# Patient Record
Sex: FEMALE | Race: BLACK OR AFRICAN AMERICAN | Employment: FULL TIME | URBAN - METROPOLITAN AREA
[De-identification: names, ages, dates, MRNs, and addresses within clinical notes are randomized per-mention and may not be internally consistent; named-entity substitution may affect disease eponyms.]

---

## 2023-04-05 PROBLEM — O35.2XX1 HEREDITARY DISEASE IN FAMILY POSSIBLY AFFECTING FETUS, AFFECTING MANAGEMENT OF MOTHER IN PREGNANCY, FETUS 1 (HHS-HCC): Status: ACTIVE | Noted: 2023-04-05

## 2023-04-05 PROBLEM — O26.849 FETAL SIZE INCONSISTENT WITH DATES (HHS-HCC): Status: ACTIVE | Noted: 2023-04-05

## 2023-04-05 PROBLEM — K02.9 PAIN DUE TO DENTAL CARIES: Status: ACTIVE | Noted: 2023-04-05

## 2023-04-05 PROBLEM — R20.2 PARESTHESIA OF RIGHT ARM: Status: ACTIVE | Noted: 2023-04-05

## 2023-04-05 PROBLEM — D58.2 HEMOGLOBIN C TRAIT (CMS-HCC): Status: ACTIVE | Noted: 2023-04-05

## 2023-04-05 PROBLEM — O09.299 HISTORY OF PRE-ECLAMPSIA IN PRIOR PREGNANCY, CURRENTLY PREGNANT (HHS-HCC): Status: ACTIVE | Noted: 2023-04-05

## 2023-04-05 PROBLEM — O09.43 HIGH RISK MULTIGRAVIDA IN THIRD TRIMESTER (HHS-HCC): Status: ACTIVE | Noted: 2023-04-05

## 2023-04-05 PROBLEM — E55.9 VITAMIN D INSUFFICIENCY: Status: ACTIVE | Noted: 2023-04-05

## 2023-04-05 PROBLEM — R35.0 URINARY FREQUENCY: Status: ACTIVE | Noted: 2023-04-05

## 2023-04-05 PROBLEM — O99.210 OBESITY IN PREGNANCY (HHS-HCC): Status: ACTIVE | Noted: 2023-04-05

## 2023-04-05 PROBLEM — R10.9 ABDOMINAL CRAMPS: Status: ACTIVE | Noted: 2023-04-05

## 2023-04-06 ENCOUNTER — OFFICE VISIT (OUTPATIENT)
Dept: PRIMARY CARE | Facility: CLINIC | Age: 28
End: 2023-04-06
Payer: COMMERCIAL

## 2023-04-06 VITALS
SYSTOLIC BLOOD PRESSURE: 130 MMHG | DIASTOLIC BLOOD PRESSURE: 80 MMHG | WEIGHT: 242 LBS | HEART RATE: 90 BPM | BODY MASS INDEX: 42.88 KG/M2 | OXYGEN SATURATION: 97 % | HEIGHT: 63 IN

## 2023-04-06 DIAGNOSIS — Z30.011 ORAL CONTRACEPTION INITIATION: ICD-10-CM

## 2023-04-06 DIAGNOSIS — M70.51 PATELLAR BURSITIS OF RIGHT KNEE: ICD-10-CM

## 2023-04-06 DIAGNOSIS — E66.01 MORBID OBESITY WITH BODY MASS INDEX (BMI) OF 40.0 OR HIGHER (MULTI): Primary | ICD-10-CM

## 2023-04-06 PROCEDURE — 1036F TOBACCO NON-USER: CPT | Performed by: INTERNAL MEDICINE

## 2023-04-06 PROCEDURE — 99204 OFFICE O/P NEW MOD 45 MIN: CPT | Performed by: INTERNAL MEDICINE

## 2023-04-06 RX ORDER — NORETHINDRONE ACETATE AND ETHINYL ESTRADIOL 1.5-30(21)
1 KIT ORAL DAILY
Qty: 90 TABLET | Refills: 3 | Status: SHIPPED | OUTPATIENT
Start: 2023-04-06 | End: 2024-01-24 | Stop reason: SDUPTHER

## 2023-04-06 RX ORDER — MELOXICAM 7.5 MG/1
7.5-15 TABLET ORAL DAILY PRN
Qty: 60 TABLET | Refills: 1 | Status: SHIPPED | OUTPATIENT
Start: 2023-04-06 | End: 2024-04-05

## 2023-07-27 ENCOUNTER — APPOINTMENT (OUTPATIENT)
Dept: PRIMARY CARE | Facility: CLINIC | Age: 28
End: 2023-07-27
Payer: COMMERCIAL

## 2024-01-22 ENCOUNTER — APPOINTMENT (OUTPATIENT)
Dept: OBSTETRICS AND GYNECOLOGY | Facility: CLINIC | Age: 29
End: 2024-01-22
Payer: COMMERCIAL

## 2024-01-23 PROBLEM — R00.0 TACHYCARDIA: Status: ACTIVE | Noted: 2018-01-18

## 2024-01-23 PROBLEM — O99.810 ABNORMAL GLUCOSE AFFECTING PREGNANCY (HHS-HCC): Status: ACTIVE | Noted: 2018-03-14

## 2024-01-23 PROBLEM — M94.0 COSTOCHONDRITIS: Status: ACTIVE | Noted: 2024-01-23

## 2024-01-23 PROBLEM — M25.561 PATELLOFEMORAL INSTABILITY OF RIGHT KNEE WITH PAIN: Status: ACTIVE | Noted: 2022-02-14

## 2024-01-23 PROBLEM — O21.9 NAUSEA AND VOMITING DURING PREGNANCY (HHS-HCC): Status: ACTIVE | Noted: 2018-03-13

## 2024-01-23 PROBLEM — M25.361 PATELLOFEMORAL INSTABILITY OF RIGHT KNEE WITH PAIN: Status: ACTIVE | Noted: 2022-02-14

## 2024-01-23 PROBLEM — R07.9 CHEST PAIN: Status: ACTIVE | Noted: 2018-01-18

## 2024-01-23 PROBLEM — K01.1 IMPACTED TOOTH: Status: ACTIVE | Noted: 2018-02-13

## 2024-01-23 RX ORDER — FLUTICASONE PROPIONATE 50 MCG
2 SPRAY, SUSPENSION (ML) NASAL DAILY
COMMUNITY
Start: 2014-10-30 | End: 2024-01-24 | Stop reason: WASHOUT

## 2024-01-23 RX ORDER — EPINEPHRINE 0.3 MG/.3ML
INJECTION SUBCUTANEOUS
COMMUNITY
Start: 2014-09-17 | End: 2024-01-24 | Stop reason: SDUPTHER

## 2024-01-23 RX ORDER — IBUPROFEN 600 MG/1
TABLET ORAL
COMMUNITY
End: 2024-01-24 | Stop reason: WASHOUT

## 2024-01-23 RX ORDER — TRAMADOL HYDROCHLORIDE 50 MG/1
TABLET ORAL
COMMUNITY
Start: 2023-08-10 | End: 2024-01-24 | Stop reason: WASHOUT

## 2024-01-23 RX ORDER — SPIRONOLACTONE 50 MG/1
100 TABLET, FILM COATED ORAL DAILY
COMMUNITY
Start: 2023-06-01 | End: 2024-01-24 | Stop reason: WASHOUT

## 2024-01-24 ENCOUNTER — OFFICE VISIT (OUTPATIENT)
Dept: PRIMARY CARE | Facility: CLINIC | Age: 29
End: 2024-01-24
Payer: COMMERCIAL

## 2024-01-24 ENCOUNTER — LAB (OUTPATIENT)
Dept: LAB | Facility: LAB | Age: 29
End: 2024-01-24
Payer: COMMERCIAL

## 2024-01-24 VITALS
WEIGHT: 247 LBS | HEART RATE: 78 BPM | SYSTOLIC BLOOD PRESSURE: 136 MMHG | HEIGHT: 63 IN | DIASTOLIC BLOOD PRESSURE: 80 MMHG | RESPIRATION RATE: 18 BRPM | OXYGEN SATURATION: 93 % | BODY MASS INDEX: 43.77 KG/M2

## 2024-01-24 DIAGNOSIS — Z00.00 HEALTHCARE MAINTENANCE: Primary | ICD-10-CM

## 2024-01-24 DIAGNOSIS — Z12.4 CERVICAL CANCER SCREENING: ICD-10-CM

## 2024-01-24 DIAGNOSIS — L20.9 ATOPIC DERMATITIS, UNSPECIFIED TYPE: ICD-10-CM

## 2024-01-24 DIAGNOSIS — Z11.3 SCREEN FOR STD (SEXUALLY TRANSMITTED DISEASE): ICD-10-CM

## 2024-01-24 DIAGNOSIS — Z30.011 ORAL CONTRACEPTION INITIATION: ICD-10-CM

## 2024-01-24 DIAGNOSIS — Z87.892 HISTORY OF ANAPHYLAXIS: ICD-10-CM

## 2024-01-24 DIAGNOSIS — L21.9 SEBORRHEIC DERMATITIS: ICD-10-CM

## 2024-01-24 DIAGNOSIS — Z00.00 HEALTHCARE MAINTENANCE: ICD-10-CM

## 2024-01-24 PROBLEM — Z30.09 CONTRACEPTIVE EDUCATION: Status: ACTIVE | Noted: 2024-01-24

## 2024-01-24 LAB
ALBUMIN SERPL BCP-MCNC: 4.5 G/DL (ref 3.4–5)
ALP SERPL-CCNC: 45 U/L (ref 33–110)
ALT SERPL W P-5'-P-CCNC: 15 U/L (ref 7–45)
ANION GAP SERPL CALC-SCNC: 13 MMOL/L (ref 10–20)
AST SERPL W P-5'-P-CCNC: 15 U/L (ref 9–39)
BASOPHILS # BLD AUTO: 0.07 X10*3/UL (ref 0–0.1)
BASOPHILS NFR BLD AUTO: 0.7 %
BILIRUB SERPL-MCNC: 0.6 MG/DL (ref 0–1.2)
BUN SERPL-MCNC: 13 MG/DL (ref 6–23)
CALCIUM SERPL-MCNC: 9.5 MG/DL (ref 8.6–10.6)
CHLORIDE SERPL-SCNC: 106 MMOL/L (ref 98–107)
CHOLEST SERPL-MCNC: 169 MG/DL (ref 0–199)
CHOLESTEROL/HDL RATIO: 2.3
CO2 SERPL-SCNC: 26 MMOL/L (ref 21–32)
CREAT SERPL-MCNC: 0.72 MG/DL (ref 0.5–1.05)
EGFRCR SERPLBLD CKD-EPI 2021: >90 ML/MIN/1.73M*2
EOSINOPHIL # BLD AUTO: 0.23 X10*3/UL (ref 0–0.7)
EOSINOPHIL NFR BLD AUTO: 2.3 %
ERYTHROCYTE [DISTWIDTH] IN BLOOD BY AUTOMATED COUNT: 13.8 % (ref 11.5–14.5)
EST. AVERAGE GLUCOSE BLD GHB EST-MCNC: 105 MG/DL
GLUCOSE SERPL-MCNC: 75 MG/DL (ref 74–99)
HBA1C MFR BLD: 5.3 %
HCT VFR BLD AUTO: 39.6 % (ref 36–46)
HDLC SERPL-MCNC: 74.8 MG/DL
HGB BLD-MCNC: 13.9 G/DL (ref 12–16)
HIV 1+2 AB+HIV1 P24 AG SERPL QL IA: NONREACTIVE
IMM GRANULOCYTES # BLD AUTO: 0.02 X10*3/UL (ref 0–0.7)
IMM GRANULOCYTES NFR BLD AUTO: 0.2 % (ref 0–0.9)
LDLC SERPL DIRECT ASSAY-MCNC: 90 MG/DL (ref 0–129)
LYMPHOCYTES # BLD AUTO: 3.77 X10*3/UL (ref 1.2–4.8)
LYMPHOCYTES NFR BLD AUTO: 37 %
MCH RBC QN AUTO: 30.4 PG (ref 26–34)
MCHC RBC AUTO-ENTMCNC: 35.1 G/DL (ref 32–36)
MCV RBC AUTO: 87 FL (ref 80–100)
MONOCYTES # BLD AUTO: 0.9 X10*3/UL (ref 0.1–1)
MONOCYTES NFR BLD AUTO: 8.8 %
NEUTROPHILS # BLD AUTO: 5.19 X10*3/UL (ref 1.2–7.7)
NEUTROPHILS NFR BLD AUTO: 51 %
NON-HDL CHOLESTEROL: 94 MG/DL (ref 0–149)
NRBC BLD-RTO: 0 /100 WBCS (ref 0–0)
PLATELET # BLD AUTO: 311 X10*3/UL (ref 150–450)
POTASSIUM SERPL-SCNC: 4.8 MMOL/L (ref 3.5–5.3)
PROT SERPL-MCNC: 7.4 G/DL (ref 6.4–8.2)
RBC # BLD AUTO: 4.57 X10*6/UL (ref 4–5.2)
SODIUM SERPL-SCNC: 140 MMOL/L (ref 136–145)
TREPONEMA PALLIDUM IGG+IGM AB [PRESENCE] IN SERUM OR PLASMA BY IMMUNOASSAY: NONREACTIVE
TSH SERPL-ACNC: 1.31 MIU/L (ref 0.44–3.98)
WBC # BLD AUTO: 10.2 X10*3/UL (ref 4.4–11.3)

## 2024-01-24 PROCEDURE — 87800 DETECT AGNT MULT DNA DIREC: CPT

## 2024-01-24 PROCEDURE — 99395 PREV VISIT EST AGE 18-39: CPT | Performed by: INTERNAL MEDICINE

## 2024-01-24 PROCEDURE — 83721 ASSAY OF BLOOD LIPOPROTEIN: CPT

## 2024-01-24 PROCEDURE — 36415 COLL VENOUS BLD VENIPUNCTURE: CPT

## 2024-01-24 PROCEDURE — 83718 ASSAY OF LIPOPROTEIN: CPT

## 2024-01-24 PROCEDURE — 1036F TOBACCO NON-USER: CPT | Performed by: INTERNAL MEDICINE

## 2024-01-24 PROCEDURE — 87389 HIV-1 AG W/HIV-1&-2 AB AG IA: CPT

## 2024-01-24 PROCEDURE — 3008F BODY MASS INDEX DOCD: CPT | Performed by: INTERNAL MEDICINE

## 2024-01-24 PROCEDURE — 80053 COMPREHEN METABOLIC PANEL: CPT

## 2024-01-24 PROCEDURE — 85025 COMPLETE CBC W/AUTO DIFF WBC: CPT

## 2024-01-24 PROCEDURE — 84443 ASSAY THYROID STIM HORMONE: CPT

## 2024-01-24 PROCEDURE — 86780 TREPONEMA PALLIDUM: CPT

## 2024-01-24 PROCEDURE — 83036 HEMOGLOBIN GLYCOSYLATED A1C: CPT

## 2024-01-24 PROCEDURE — 82465 ASSAY BLD/SERUM CHOLESTEROL: CPT

## 2024-01-24 PROCEDURE — 87661 TRICHOMONAS VAGINALIS AMPLIF: CPT

## 2024-01-24 RX ORDER — NORETHINDRONE ACETATE AND ETHINYL ESTRADIOL 1.5-30(21)
1 KIT ORAL DAILY
Qty: 90 TABLET | Refills: 3 | Status: SHIPPED | OUTPATIENT
Start: 2024-01-24 | End: 2025-01-23

## 2024-01-24 RX ORDER — KETOCONAZOLE 20 MG/ML
SHAMPOO, SUSPENSION TOPICAL 2 TIMES WEEKLY
Qty: 120 ML | Refills: 0 | Status: SHIPPED | OUTPATIENT
Start: 2024-01-25

## 2024-01-24 RX ORDER — KETOCONAZOLE 20 MG/G
CREAM TOPICAL 2 TIMES DAILY
Qty: 30 G | Refills: 2 | Status: SHIPPED | OUTPATIENT
Start: 2024-01-24 | End: 2024-04-23

## 2024-01-24 RX ORDER — TRIAMCINOLONE ACETONIDE 1 MG/G
OINTMENT TOPICAL 2 TIMES DAILY PRN
Qty: 15 G | Refills: 0 | Status: SHIPPED | OUTPATIENT
Start: 2024-01-24 | End: 2024-05-23

## 2024-01-24 RX ORDER — EPINEPHRINE 0.3 MG/.3ML
INJECTION SUBCUTANEOUS
Qty: 2 EACH | Refills: 3 | Status: SHIPPED | OUTPATIENT
Start: 2024-01-24

## 2024-01-24 ASSESSMENT — ENCOUNTER SYMPTOMS
FEVER: 0
COUGH: 0
HEMATURIA: 0
JOINT SWELLING: 0
BACK PAIN: 0
ABDOMINAL DISTENTION: 0
SORE THROAT: 0
NECK STIFFNESS: 0
FREQUENCY: 0
BLOOD IN STOOL: 0
ABDOMINAL PAIN: 0
DYSURIA: 0
NUMBNESS: 0
DIFFICULTY URINATING: 0
NAUSEA: 0
WHEEZING: 0
SHORTNESS OF BREATH: 0
ARTHRALGIAS: 0
SINUS PRESSURE: 0
DIARRHEA: 0
CHILLS: 0
MYALGIAS: 0
FATIGUE: 0
DIAPHORESIS: 0
VOMITING: 0
LIGHT-HEADEDNESS: 0
APPETITE CHANGE: 0
RHINORRHEA: 0
DIZZINESS: 0
HEADACHES: 0
CONSTIPATION: 0
WEAKNESS: 0
PALPITATIONS: 0
NECK PAIN: 0

## 2024-01-24 NOTE — ASSESSMENT & PLAN NOTE
She complains of dry flaky scalp  Ketoconazole shampoo twice weekly  Ketoconazole cream twice daily to scalp for 2 weeks then stop

## 2024-01-24 NOTE — PROGRESS NOTES
"Subjective   Patient ID: Srinivas Francis is a 29 y.o. female who presents for Annual Exam (Establish care/Refills /Discuss birth control options ).    HPI   She presents to establish care.  She has no chronic medical conditions.  She does not smoke cigarettes occasionally drinks alcohol.  She complains of dry flaky scalp and rash on left side of neck.    Review of Systems   Constitutional:  Negative for appetite change, chills, diaphoresis, fatigue and fever.   HENT:  Negative for congestion, ear discharge, ear pain, hearing loss, postnasal drip, rhinorrhea, sinus pressure, sore throat and tinnitus.    Eyes:  Negative for visual disturbance.   Respiratory:  Negative for cough, shortness of breath and wheezing.    Cardiovascular:  Negative for chest pain, palpitations and leg swelling.   Gastrointestinal:  Negative for abdominal distention, abdominal pain, blood in stool, constipation, diarrhea, nausea and vomiting.   Genitourinary:  Negative for decreased urine volume, difficulty urinating, dysuria, frequency, hematuria and urgency.   Musculoskeletal:  Negative for arthralgias, back pain, gait problem, joint swelling, myalgias, neck pain and neck stiffness.   Skin:  Negative for rash.   Neurological:  Negative for dizziness, weakness, light-headedness, numbness and headaches.       Objective   /80   Pulse 78   Resp 18   Ht 1.6 m (5' 3\")   Wt 112 kg (247 lb)   LMP 01/09/2024 (Exact Date)   SpO2 93%   BMI 43.75 kg/m²     Physical Exam  Vitals reviewed.   Constitutional:       Appearance: Normal appearance. She is normal weight.   HENT:      Right Ear: Tympanic membrane and external ear normal.      Left Ear: Tympanic membrane and external ear normal.   Eyes:      Extraocular Movements: Extraocular movements intact.      Conjunctiva/sclera: Conjunctivae normal.      Pupils: Pupils are equal, round, and reactive to light.   Cardiovascular:      Rate and Rhythm: Normal rate and regular rhythm.      Pulses: " Normal pulses.   Pulmonary:      Effort: Pulmonary effort is normal.      Breath sounds: Normal breath sounds.   Abdominal:      General: Bowel sounds are normal.      Palpations: Abdomen is soft.   Musculoskeletal:         General: Normal range of motion.      Cervical back: Normal range of motion.   Skin:     General: Skin is warm and dry.   Neurological:      General: No focal deficit present.      Mental Status: She is oriented to person, place, and time.   Psychiatric:         Mood and Affect: Mood normal.         Behavior: Behavior normal.         Assessment/Plan   Problem List Items Addressed This Visit             ICD-10-CM    Healthcare maintenance - Primary Z00.00     See gynecology for pap smear          Relevant Orders    CBC and Auto Differential    Cholesterol, LDL Direct    Comprehensive Metabolic Panel    Hemoglobin A1C    Lipid Panel Non-Fasting    TSH with reflex to Free T4 if abnormal    History of anaphylaxis Z87.892    Relevant Medications    EPINEPHrine 0.3 mg/0.3 mL injection syringe    Screen for STD (sexually transmitted disease) Z11.3    Relevant Orders    C. Trachomatis / N. Gonorrhoeae, Amplified Detection    HIV 1/2 Antigen/Antibody Screen with Reflex to Confirmation    Syphilis Screen with Reflex    Trichomonas vaginalis, Nucleic Acid Detection    Seborrheic dermatitis L21.9     She complains of dry flaky scalp  Ketoconazole shampoo twice weekly  Ketoconazole cream twice daily to scalp for 2 weeks then stop         Relevant Medications    ketoconazole (NIZOral) 2 % cream    ketoconazole (NIZOral) 2 % shampoo (Start on 1/25/2024)    Atopic dermatitis L20.9     She complains of a pruritic rash on the left side of the neck  O/E: Hypopigmented maculopapular rash with excoriations to the left lateral side of neck  Start triamcinolone to affected area twice daily for 2 weeks then stop           Relevant Medications    triamcinolone (Kenalog) 0.1 % ointment    Oral contraception initiation  Z30.011    Relevant Medications    norethindrone-e.estradioL-iron (Microgestin FE 1.5/30) 1.5 mg-30 mcg (21)/75 mg (7) tablet    BMI 40.0-44.9, adult (CMS/HCC) Z68.41    Relevant Orders    Referral to Nutrition Services    Cervical cancer screening Z12.4    Relevant Orders    Referral to Gynecology     RTC 6-12 mo

## 2024-01-24 NOTE — ASSESSMENT & PLAN NOTE
She complains of a pruritic rash on the left side of the neck  O/E: Hypopigmented maculopapular rash with excoriations to the left lateral side of neck  Start triamcinolone to affected area twice daily for 2 weeks then stop

## 2024-01-25 LAB
C TRACH RRNA SPEC QL NAA+PROBE: NEGATIVE
N GONORRHOEA DNA SPEC QL PROBE+SIG AMP: NEGATIVE
T VAGINALIS RRNA SPEC QL NAA+PROBE: NEGATIVE

## 2024-06-18 ENCOUNTER — TELEPHONE (OUTPATIENT)
Dept: OBSTETRICS AND GYNECOLOGY | Facility: CLINIC | Age: 29
End: 2024-06-18
Payer: COMMERCIAL

## 2024-06-18 DIAGNOSIS — R10.11 RUQ PAIN: ICD-10-CM

## 2024-06-18 NOTE — TELEPHONE ENCOUNTER
Pt states she is having AUB and night sweats. Pt LMP 6/6/24 through 6/10/24. Pt currently on BC pills. Pt states she started spotting again yesterday and passed some ildefonso sized blood clot and had some cramping. Today she is having some bleeding that just deep red color. Pt scheduled to see Felicity Wolf Thursday 6/20 at [facility].  Discussed bleeding precautions with pt.  Pt denies having further questions at this time.

## 2024-06-20 ENCOUNTER — OFFICE VISIT (OUTPATIENT)
Dept: OBSTETRICS AND GYNECOLOGY | Facility: CLINIC | Age: 29
End: 2024-06-20
Payer: COMMERCIAL

## 2024-06-20 VITALS
BODY MASS INDEX: 43.12 KG/M2 | SYSTOLIC BLOOD PRESSURE: 126 MMHG | WEIGHT: 243.4 LBS | HEIGHT: 63 IN | DIASTOLIC BLOOD PRESSURE: 84 MMHG

## 2024-06-20 DIAGNOSIS — Z30.013 ENCOUNTER FOR PRESCRIPTION FOR DEPO-PROVERA: ICD-10-CM

## 2024-06-20 DIAGNOSIS — Z11.3 SCREEN FOR STD (SEXUALLY TRANSMITTED DISEASE): ICD-10-CM

## 2024-06-20 DIAGNOSIS — N93.9 ABNORMAL UTERINE BLEEDING (AUB): Primary | ICD-10-CM

## 2024-06-20 LAB — PREGNANCY TEST URINE, POC: NEGATIVE

## 2024-06-20 PROCEDURE — 87491 CHLMYD TRACH DNA AMP PROBE: CPT

## 2024-06-20 PROCEDURE — 3008F BODY MASS INDEX DOCD: CPT

## 2024-06-20 PROCEDURE — 87661 TRICHOMONAS VAGINALIS AMPLIF: CPT

## 2024-06-20 PROCEDURE — 99213 OFFICE O/P EST LOW 20 MIN: CPT

## 2024-06-20 PROCEDURE — 96372 THER/PROPH/DIAG INJ SC/IM: CPT

## 2024-06-20 PROCEDURE — 81025 URINE PREGNANCY TEST: CPT

## 2024-06-20 PROCEDURE — 87591 N.GONORRHOEAE DNA AMP PROB: CPT

## 2024-06-20 RX ORDER — MEDROXYPROGESTERONE ACETATE 150 MG/ML
150 INJECTION, SUSPENSION INTRAMUSCULAR ONCE
Status: COMPLETED | OUTPATIENT
Start: 2024-06-20 | End: 2024-06-20

## 2024-06-20 ASSESSMENT — ENCOUNTER SYMPTOMS
ALLERGIC/IMMUNOLOGIC NEGATIVE: 0
CARDIOVASCULAR NEGATIVE: 0
ENDOCRINE NEGATIVE: 0
GASTROINTESTINAL NEGATIVE: 0
RESPIRATORY NEGATIVE: 0
MUSCULOSKELETAL NEGATIVE: 0
CONSTITUTIONAL NEGATIVE: 0
HEMATOLOGIC/LYMPHATIC NEGATIVE: 0
EYES NEGATIVE: 0
NEUROLOGICAL NEGATIVE: 0
PSYCHIATRIC NEGATIVE: 0

## 2024-06-20 ASSESSMENT — PAIN SCALES - GENERAL: PAINLEVEL: 0-NO PAIN

## 2024-06-20 NOTE — PROGRESS NOTES
"Subjective   Srinivas German is a 29 y.o. female who is here for Vaginal Bleeding (Pt reports breakthrough bleeding after regular menstraual cycle on bc pills currently, has concerns).     Concerns today:  At night for the past 2 weeks she's been having hot flashes. 6-10 regular period. Bleeding again 2 days 17th and 18th and then stopped. Patient is on OCP and missed a few days earlier this month.     Periods are regular every 28-30 days, lasting 3 days.   Dysmenorrhea: this past month 6/10 cramping. Managed well by tylenol .   Cyclic symptoms include none.      Sexual Activity: sexually active, male partners; Patient reports 1 partners in the last 12 months.  Pain with intercourse? No   Loss of desire? No   Able to have an orgasm? Yes     History of prior STI: none  Desires STI screening? Yes  Current contraception: OCP (estrogen/progesterone)    OB History    Para Term  AB Living   2 2 1 1 0 2   SAB IAB Ectopic Multiple Live Births   0 0 0 0 2      Objective   /84   Ht 1.6 m (5' 3\")   Wt 110 kg (243 lb 6.4 oz)   LMP 2024 (Exact Date)      General:   Alert and oriented x 3, obese   Abdomen: Soft, non tender   Pelvic exam declined.       Assessment/Plan   Diagnoses and all orders for this visit:  Screen for STD (sexually transmitted disease)  -     Trichomonas vaginalis, Amplified; Future  -     C. Trachomatis / N. Gonorrhoeae, Amplified Detection  Encounter for prescription for depo-Provera  -     medroxyPROGESTERone (Depo-Provera) injection 150 mg  -     POCT pregnancy, urine manually resulted    We discussed treatments for abnormal bleeding. Patient is interested in depo. Discussed risks and benefits. Administered today.   Discussed importance of timed dosing and follow up. Patient verbalizes understanding.   Offered patient blood born stds, declines at this time.   Encouraged patient to follow up with an annual GYN visit. Plan to address hot flashes,  contraception, and pap at " that time.    Encouraged to reach out to our office with any questions or concerns.       MARNI Jaquez

## 2024-07-02 ENCOUNTER — APPOINTMENT (OUTPATIENT)
Dept: PRIMARY CARE | Facility: CLINIC | Age: 29
End: 2024-07-02
Payer: COMMERCIAL

## 2024-07-12 ENCOUNTER — TELEMEDICINE CLINICAL SUPPORT (OUTPATIENT)
Dept: NUTRITION | Facility: HOSPITAL | Age: 29
End: 2024-07-12
Payer: COMMERCIAL

## 2024-07-12 VITALS — BODY MASS INDEX: 43.12 KG/M2 | HEIGHT: 63 IN

## 2024-07-12 PROCEDURE — 97802 MEDICAL NUTRITION INDIV IN: CPT | Mod: 95 | Performed by: INTERNAL MEDICINE

## 2024-07-12 NOTE — PROGRESS NOTES
"Nutrition Assessment     Reason for Visit:  Srinivas German is a 29 y.o. female who presents for Weight Loss    Anthropometrics:  Anthropometrics  Height: 160 cm (5' 3\")  IBW/kg (Dietitian Calculated): 52.2 kg  Weight Change  Weight History / % Weight Change: No recent significant weight changes per pt chart.  Significant Weight Loss: No       Wt Readings from Last 10 Encounters:   06/20/24 110 kg (243 lb 6.4 oz)   01/24/24 112 kg (247 lb)   04/06/23 110 kg (242 lb)   11/07/22 103 kg (228 lb)   09/27/22 108 kg (239 lb)   09/20/22 108 kg (239 lb)   07/15/22 103 kg (226 lb 2 oz)   07/04/22 105 kg (231 lb 14.8 oz)   06/14/22 104 kg (230 lb)   05/02/22 103 kg (227 lb)       Food And Nutrient Intake:  Food and Nutrient History  Food and Nutrient History: Spoke with pt over the phone today. She states that she is looking to get more information on a healthy diet. She reports that she would like to start eating better and find alternatives to help her curve her appetite. She lives in the household with her  and kids, where both her and her  work from home. Pt was also interested in different recipes to try.  Energy Intake: Good > 75 %  Fluid Intake: Primarily water  Food Allergy: Tree nuts and shellfish  GI Symptoms: none     Food Intake  Meal 1: Breakfast- Omlette with hernandez and hash browns  Meal 2: Lunch- Skip or have a snack of chips  Meal 3: Dinner- Cabbage and cornbread or chicken wrap and salad  Snacks: Popcorn, chips, ice cream    Food Preparation  Cooking: Patient  Grocery Shopping: Patient  Dining Out: 1 to 3 times a week  Other: [employer]orders out breakfast, lunch, or dinner when working from home.       Medication and Complementary/Alternative Medicine Use  Prescription Medication Use: Not currently taking any medications    Physical Activities:  Physical Activity  Physical Activity History: Pt has started walking with children around the neighborhood one time per week.       Nutrition Focused Physical " "Exam:  Subcutaneous Fat Loss  Orbital Fat Pads: Defer (Virtual visit. Pt with good appetite and no significant weight loss.)  Muscle Wasting  Temporalis: Defer (Virtual visit. Pt with good appetite and no significant weight loss.)      Energy Needs  Calculated Energy Needs Using Equations  Height: 160 cm (5' 3\")  Weight Used for Equation Calculations: 110 kg (242 lb 8.1 oz)  [place] [address] [place] Equation (Overweight or Obese Patients): 1789  Equation Chosen to Use by RD: Loki Bustillo  Activity Factor: 1.2  Total Energy Needs: 2147  Estimated Fluid Needs  Total Fluid Estimated Needs (mL): 2147 mL        Nutrition Diagnosis      Nutrition Diagnosis  Patient has Nutrition Diagnosis: Yes  Diagnosis Status (1): New  Nutrition Diagnosis 1: Excessive energy intake  Related to (1): Food and nutrition related knowledge deficit  As Evidenced by (1): No previous nutrition education, Food recall, and BMI 43.12    Nutrition Interventions/Recommendations   Nutrition Prescription  Individualized Nutrition Prescription Provided for : Mediterranean diet  Food and Nutrition Delivery  Meals & Snacks: General Healthful Diet, Modify Composition of Meals/Snacks  Goals: Follow mediterranean diet with 3 meals per day and up to 2 snacks per day at consistent times.  Nutrition Education  Nutrition Education Content: Content related nutrition education  Goals: Patient goals: 1. Subsitute fruit for bag of chips for a snack. 2. Practice measuring out foods and following portion sizes.        Patient Instructions   Follow Mediterranean diet consuming a variety of vegetables and fruits, nuts, legumes, olive oil, moderate lean meats like fish, seafood, and dairy.   Follow portion sized by reading nutrition labels and measuring out foods with measuring utensils.     Nutrition Monitoring and Evaluation   Food/Nutrient Related History Monitoring  Monitoring and Evaluation Plan: Energy intake, Meal/snack pattern  Meal/Snack Pattern: Estimated meal and " snack pattern  Criteria: Consume 3 meals and up to 2 snacks per day while following the Mediterranean diet. Follow protion sizes by reading nutriton lables.  Body Composition/Growth/Weight History  Monitoring and Evaluation Plan: Weight  Weight: Measured weight  Criteria: Monitor weight changes.           Readiness to Change : Good  Level of Understanding : Good  Anticipated Compliant : Good

## 2024-07-12 NOTE — PATIENT INSTRUCTIONS
Follow Mediterranean diet consuming a variety of vegetables and fruits, nuts, legumes, olive oil, moderate lean meats like fish, seafood, and dairy.   Follow portion sized by reading nutrition labels and measuring out foods with measuring utensils.

## 2024-07-24 ENCOUNTER — APPOINTMENT (OUTPATIENT)
Dept: PRIMARY CARE | Facility: CLINIC | Age: 29
End: 2024-07-24
Payer: COMMERCIAL

## 2024-09-03 ENCOUNTER — APPOINTMENT (OUTPATIENT)
Dept: OBSTETRICS AND GYNECOLOGY | Facility: CLINIC | Age: 29
End: 2024-09-03
Payer: COMMERCIAL

## 2024-09-03 VITALS — DIASTOLIC BLOOD PRESSURE: 78 MMHG | BODY MASS INDEX: 40.21 KG/M2 | WEIGHT: 227 LBS | SYSTOLIC BLOOD PRESSURE: 126 MMHG

## 2024-09-03 DIAGNOSIS — N93.9 ABNORMAL UTERINE BLEEDING (AUB): ICD-10-CM

## 2024-09-03 DIAGNOSIS — Z30.42 ENCOUNTER FOR SURVEILLANCE OF INJECTABLE CONTRACEPTIVE: ICD-10-CM

## 2024-09-03 DIAGNOSIS — Z01.419 ENCOUNTER FOR ANNUAL ROUTINE GYNECOLOGICAL EXAMINATION: Primary | ICD-10-CM

## 2024-09-03 PROCEDURE — 96372 THER/PROPH/DIAG INJ SC/IM: CPT | Performed by: OBSTETRICS & GYNECOLOGY

## 2024-09-03 PROCEDURE — 1036F TOBACCO NON-USER: CPT | Performed by: OBSTETRICS & GYNECOLOGY

## 2024-09-03 PROCEDURE — 99395 PREV VISIT EST AGE 18-39: CPT | Performed by: OBSTETRICS & GYNECOLOGY

## 2024-09-03 RX ORDER — MEDROXYPROGESTERONE ACETATE 150 MG/ML
150 INJECTION, SUSPENSION INTRAMUSCULAR ONCE
Status: COMPLETED | OUTPATIENT
Start: 2024-09-03 | End: 2024-09-03

## 2024-09-03 ASSESSMENT — ENCOUNTER SYMPTOMS
MUSCULOSKELETAL NEGATIVE: 0
RESPIRATORY NEGATIVE: 0
HEMATOLOGIC/LYMPHATIC NEGATIVE: 0
ALLERGIC/IMMUNOLOGIC NEGATIVE: 0
NEUROLOGICAL NEGATIVE: 0
EYES NEGATIVE: 0
ENDOCRINE NEGATIVE: 0
CARDIOVASCULAR NEGATIVE: 0
CONSTITUTIONAL NEGATIVE: 0
GASTROINTESTINAL NEGATIVE: 0
PSYCHIATRIC NEGATIVE: 0

## 2024-09-03 NOTE — PROGRESS NOTES
"29-year-old morbidly obese -0-0-2 -American woman presents for annual GYN exam.  She is using Depo-Provera for contraception.    She is switched to the Depo-Provera because she was having abnormal uterine bleeding on the birth control pill.  She still reports some abnormal bleeding and occasional \"hot flashes \".    Subjective   Patient ID: Srinivas Francis is a 29 y.o. female who presents for Annual Exam (Patient here for annual visit and depo//Issues or Concerns: None///LMP: /Last Pap: 2018/Last Mammogram: N/A/Last Colonoscopy: N/A/STI/STD Testing: None/Chaperone: in room// ).  HPI    Review of Systems    Objective   Physical Exam  Exam conducted with a chaperone present.   Constitutional:       Appearance: She is obese.   HENT:      Head: Normocephalic.      Right Ear: External ear normal.      Left Ear: External ear normal.      Nose: Nose normal.      Mouth/Throat:      Mouth: Mucous membranes are moist.   Eyes:      Extraocular Movements: Extraocular movements intact.      Pupils: Pupils are equal, round, and reactive to light.   Cardiovascular:      Rate and Rhythm: Normal rate and regular rhythm.      Heart sounds: Normal heart sounds.   Pulmonary:      Effort: Pulmonary effort is normal.      Breath sounds: Normal breath sounds.   Chest:   Breasts:     Right: Normal.      Left: Normal.   Abdominal:      Palpations: Abdomen is soft.   Genitourinary:     General: Normal vulva.      Labia:         Right: No rash or lesion.         Left: No rash or lesion.       Vagina: Normal.      Cervix: Normal. No cervical motion tenderness.      Uterus: Normal.       Adnexa: Right adnexa normal and left adnexa normal.      Comments: ? 1 cm left fibroid  Musculoskeletal:         General: Normal range of motion.      Cervical back: Normal range of motion and neck supple.   Skin:     General: Skin is warm and dry.   Neurological:      General: No focal deficit present.      Mental Status: She is alert and " oriented to person, place, and time.   Psychiatric:         Mood and Affect: Mood normal.         Behavior: Behavior normal.         Assessment/Plan            Reina Aguirre MD 09/03/24 4:09 PM     A/P: APE     -  Pap sent     -  PCP F/U     -  RTC Q 12 weeks for Depo Provera     AU     -   UPT     -  Labs     -

## 2024-09-03 NOTE — PATIENT INSTRUCTIONS
Thanks for coming in today for your annual GYN exam.    A Pap smear was sent.  Results should be available in the next few weeks.  However, if you have been unable to review the results by the end of the month please give the office a call.    Labs are being sent today to help evaluate your irregular cycle.  Results should be available 24 to 48 hours after blood work has been drawn.    Arrange to have an ultrasound performed to help evaluate your abnormal bleeding.    Return to the office every 12 weeks for your next Depo-Provera injection.    Follow-up with your PCP and other healthcare specialist as needed.    Feel free to call the office with any problems, questions or concerns prior to your next scheduled visit.

## 2024-09-11 LAB
CYTOLOGY CMNT CVX/VAG CYTO-IMP: NORMAL
HPV HR 12 DNA GENITAL QL NAA+PROBE: NEGATIVE
HPV HR GENOTYPES PNL CVX NAA+PROBE: NEGATIVE
HPV16 DNA SPEC QL NAA+PROBE: NEGATIVE
HPV18 DNA SPEC QL NAA+PROBE: NEGATIVE
LAB AP CONTRACEPTIVE HISTORY: NORMAL
LAB AP HPV GENOTYPE QUESTION: YES
LAB AP HPV HR: NORMAL
LABORATORY COMMENT REPORT: NORMAL
PATH REPORT.TOTAL CANCER: NORMAL

## 2024-09-13 ENCOUNTER — APPOINTMENT (OUTPATIENT)
Dept: OBSTETRICS AND GYNECOLOGY | Facility: CLINIC | Age: 29
End: 2024-09-13
Payer: COMMERCIAL

## 2024-09-25 ENCOUNTER — APPOINTMENT (OUTPATIENT)
Dept: PRIMARY CARE | Facility: CLINIC | Age: 29
End: 2024-09-25
Payer: COMMERCIAL